# Patient Record
Sex: MALE | Race: WHITE | URBAN - METROPOLITAN AREA
[De-identification: names, ages, dates, MRNs, and addresses within clinical notes are randomized per-mention and may not be internally consistent; named-entity substitution may affect disease eponyms.]

---

## 2017-01-06 ENCOUNTER — PRE VISIT (OUTPATIENT)
Dept: CARDIOLOGY | Facility: CLINIC | Age: 27
End: 2017-01-06

## 2017-01-06 DIAGNOSIS — R94.31 ABNORMAL ELECTROCARDIOGRAM: Primary | ICD-10-CM

## 2017-02-02 ENCOUNTER — TELEPHONE (OUTPATIENT)
Dept: CARDIOLOGY | Facility: CLINIC | Age: 27
End: 2017-02-02

## 2017-02-09 ENCOUNTER — PRE VISIT (OUTPATIENT)
Dept: CARDIOLOGY | Facility: CLINIC | Age: 27
End: 2017-02-09

## 2017-02-09 DIAGNOSIS — I45.10 RBBB: Primary | ICD-10-CM

## 2017-02-15 ENCOUNTER — RADIANT APPOINTMENT (OUTPATIENT)
Dept: CARDIOLOGY | Facility: CLINIC | Age: 27
End: 2017-02-15
Attending: INTERNAL MEDICINE
Payer: COMMERCIAL

## 2017-02-15 ENCOUNTER — OFFICE VISIT (OUTPATIENT)
Dept: CARDIOLOGY | Facility: CLINIC | Age: 27
End: 2017-02-15
Payer: COMMERCIAL

## 2017-02-15 VITALS
SYSTOLIC BLOOD PRESSURE: 128 MMHG | HEART RATE: 80 BPM | WEIGHT: 196 LBS | OXYGEN SATURATION: 100 % | DIASTOLIC BLOOD PRESSURE: 82 MMHG

## 2017-02-15 DIAGNOSIS — R94.31 ABNORMAL ELECTROCARDIOGRAM: ICD-10-CM

## 2017-02-15 DIAGNOSIS — R06.02 SOB (SHORTNESS OF BREATH): ICD-10-CM

## 2017-02-15 DIAGNOSIS — R06.02 SOB (SHORTNESS OF BREATH): Primary | ICD-10-CM

## 2017-02-15 PROCEDURE — 93306 TTE W/DOPPLER COMPLETE: CPT | Performed by: INTERNAL MEDICINE

## 2017-02-15 PROCEDURE — 99203 OFFICE O/P NEW LOW 30 MIN: CPT | Mod: 25 | Performed by: INTERNAL MEDICINE

## 2017-02-15 PROCEDURE — 93000 ELECTROCARDIOGRAM COMPLETE: CPT | Performed by: INTERNAL MEDICINE

## 2017-02-15 ASSESSMENT — PAIN SCALES - GENERAL: PAINLEVEL: SEVERE PAIN (7)

## 2017-02-15 NOTE — LETTER
Date:February 16, 2017      Patient was self referred, no letter generated. Do not send.        [facility] Information

## 2017-02-15 NOTE — NURSING NOTE
Chief Complaint   Patient presents with     Heart Problem     abnormal EKG - needs EKG.  C/o being worn out for a while and some increased with exertion or exercise. Denies chest pain, dizziness- has a fainting in the past but not in over a year. Hx of abnormal rhythm from a coma (5 days induced).  No heart testing in the past       Initial /82  Pulse 80  Wt 196 lb (88.9 kg)  SpO2 100% There is no height or weight on file to calculate BMI..  BP completed using cuff size: madhav Benedict CMA

## 2017-02-15 NOTE — PROGRESS NOTES
SUBJECTIVE:  Fabio Padgett is a 26 year old male who presents for evaluation of abnormal EKG.    Prior heroin abused. 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 Had an EKG at rehab facility and this showed partial RBBB.Patient very active with no cardiac symptoms.  Non smoker.no other co-morbidities.  Had 4 syncopal episodes when he was on Heroin.     .  Current Outpatient Prescriptions   Medication Sig     Pregabalin (LYRICA PO) Take 75 mg by mouth 2 times daily     Venlafaxine HCl (EFFEXOR PO) Take 150 mg by mouth daily     No current facility-administered medications for this visit.      Past Medical History   Diagnosis Date     Depressive disorder      History reviewed. No pertinent past surgical history.  Allergies   Allergen Reactions     Penicillins Hives     Social History     Social History     Marital status: Single     Spouse name: N/A     Number of children: N/A     Years of education: N/A     Occupational History     Not on file.     Social History Main Topics     Smoking status: Current Every Day Smoker     Packs/day: 0.50     Smokeless tobacco: Not on file     Alcohol use No     Drug use: No      Comment: herion last use- 9months ago     Sexual activity: Not on file     Other Topics Concern     Not on file     Social History Narrative     Family History   Problem Relation Age of Onset     Other Cancer Father           REVIEW OF SYSTEMS:  General: negative, fever, chills, night sweats  Skin: negative, acne, rash and scaling  Eyes: negative, double vision, eye pain and photophobia  Ears/Nose/Throat: negative, nasal congestion and purulent rhinorrhea  Respiratory: No dyspnea on exertion, No cough, No hemoptysis and negative  Cardiovascular: negative, palpitations, tachycardia, irregular heart beat, chest pain, exertional chest pain or pressure, paroxysmal nocturnal dyspnea, dyspnea on exertion and orthopnea  Gastrointestinal: negative, dysphagia, nausea and vomiting  Genitourinary: negative, nocturia,  dysuria and frequency  Musculoskeletal: negative, fracture, back pain and neck pain  Neurologic: negative, headaches, stroke, seizures and paralysis  Psychiatric: negative, anxiety, nervous breakdown and depression stable  Hematologic/Lymphatic/Immunologic: negative, bleeding disorder, chills and fever  Endocrine: negative, cold intolerance, heat intolerance and hot flashes       OBJECTIVE:  Blood pressure 128/82, pulse 80, weight 88.9 kg (196 lb), SpO2 100 %.  General Appearance: healthy, alert, active and no distress  Head: Normocephalic. No masses, lesions, tenderness or abnormalities  Eyes: conjuctiva clear, PERRL, EOM intact  Ears: External ears normal. Canals clear. TM's normal.  Nose: Nares normal  Mouth: normal  Neck: Supple, no cervical adenopathy, no thyromegaly  Lungs: clear to auscultation  Cardiac: regular rate and rhythm, normal S1 and S2, no murmur  Abdomen: Soft, nontender.  Normal bowel sounds.  No hepatosplenomegaly or abnormal masses  Extremities: no peripheral edema, peripheral pulses normal  Musculoskeletal: negative  Neurological: Cranial nerves 2-12 intact, motor strength intact       ASSESSMENT/PLAN:  26 yr old with abnormal EKG. No cardiac symptoms.  Prior drug abuse. Sober for 9 months.   Reviewed outside EKG. Normal.  Reviewed today's EKG. NSR. Partial RBBB.  Will check an echo.  Patient re-assured. Near normal variant EKG.  Per orders.   Return to Clinic PRN.    Reviewed Echo and result discussed with patient. Normal LV/RV function.Normal PA pressure.Mild MR. No vegetations.

## 2017-02-15 NOTE — PATIENT INSTRUCTIONS
1.  Dr. ABEBE Barillas would like for you to have an ECHO done.  This is being done today for you, and he will discuss the results with you after your test today.        [facility] [place] [facility]    If you have any questions regarding to your visit please contact your care team:     Cardiology  Telephone Number   Maricel Duran  Cardiology RN's.    Ruth Benedict CMA [phone]    After hours: [phone].  [phone]   For scheduling appts:     [phone] or  [phone]    After hours: [phone]   For the Device Clinic (Pacemakers and ICD's)  RN's :  Tamra Edwards   During business hours: [phone]  After business hours:  [phone]- select option 4.      If you need a medication refill please contact your pharmacy.  Please allow 3 business days for your refill to be completed.    As always, Thank you for trusting us with your health care needs!  _____________________________________________________________________

## 2017-02-15 NOTE — LETTER
2/15/2017      RE: Fabio Padgett  [address]   [place] [address]       Dear Colleague,    Thank you for the opportunity to participate in the care of your patient, Fabio Padgett, at the [facility]. Please see a copy of my visit note below.       SUBJECTIVE:  Fabio Padgett is a 26 year old male who presents for evaluation of abnormal EKG.    Prior heroin abused. 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 Had an EKG at rehab facility and this showed partial RBBB.Patient very active with no cardiac symptoms.  Non smoker.no other co-morbidities.  Had 4 syncopal episodes when he was on Heroin.     .  Current Outpatient Prescriptions   Medication Sig     Pregabalin (LYRICA PO) Take 75 mg by mouth 2 times daily     Venlafaxine HCl (EFFEXOR PO) Take 150 mg by mouth daily     No current facility-administered medications for this visit.      Past Medical History   Diagnosis Date     Depressive disorder      History reviewed. No pertinent past surgical history.  Allergies   Allergen Reactions     Penicillins Hives     Social History     Social History     Marital status: Single     Spouse name: N/A     Number of children: N/A     Years of education: N/A     Occupational History     Not on file.     Social History Main Topics     Smoking status: Current Every Day Smoker     Packs/day: 0.50     Smokeless tobacco: Not on file     Alcohol use No     Drug use: No      Comment: herion last use- 9months ago     Sexual activity: Not on file     Other Topics Concern     Not on file     Social History Narrative     Family History   Problem Relation Age of Onset     Other Cancer Father           REVIEW OF SYSTEMS:  General: negative, fever, chills, night sweats  Skin: negative, acne, rash and scaling  Eyes: negative, double vision, eye pain and photophobia  Ears/Nose/Throat: negative, nasal congestion and purulent  rhinorrhea  Respiratory: No dyspnea on exertion, No cough, No hemoptysis and negative  Cardiovascular: negative, palpitations, tachycardia, irregular heart beat, chest pain, exertional chest pain or pressure, paroxysmal nocturnal dyspnea, dyspnea on exertion and orthopnea  Gastrointestinal: negative, dysphagia, nausea and vomiting  Genitourinary: negative, nocturia, dysuria and frequency  Musculoskeletal: negative, fracture, back pain and neck pain  Neurologic: negative, headaches, stroke, seizures and paralysis  Psychiatric: negative, anxiety, nervous breakdown and depression stable  Hematologic/Lymphatic/Immunologic: negative, bleeding disorder, chills and fever  Endocrine: negative, cold intolerance, heat intolerance and hot flashes       OBJECTIVE:  Blood pressure 128/82, pulse 80, weight 88.9 kg (196 lb), SpO2 100 %.  General Appearance: healthy, alert, active and no distress  Head: Normocephalic. No masses, lesions, tenderness or abnormalities  Eyes: conjuctiva clear, PERRL, EOM intact  Ears: External ears normal. Canals clear. TM's normal.  Nose: Nares normal  Mouth: normal  Neck: Supple, no cervical adenopathy, no thyromegaly  Lungs: clear to auscultation  Cardiac: regular rate and rhythm, normal S1 and S2, no murmur  Abdomen: Soft, nontender.  Normal bowel sounds.  No hepatosplenomegaly or abnormal masses  Extremities: no peripheral edema, peripheral pulses normal  Musculoskeletal: negative  Neurological: Cranial nerves 2-12 intact, motor strength intact       ASSESSMENT/PLAN:  26 yr old with abnormal EKG. No cardiac symptoms.  Prior drug abuse. Sober for 9 months.   Reviewed outside EKG. Normal.  Reviewed today's EKG. NSR. Partial RBBB.  Will check an echo.  Patient re-assured. Near normal variant EKG.  Per orders.   Return to Clinic PRN.    Reviewed Echo and result discussed with patient. Normal LV/RV function.Normal PA pressure.Mild MR. No vegetations.    Please do not hesitate to contact me if you have  any questions/concerns.     Sincerely,     FELISA Burch MD

## 2017-03-03 ENCOUNTER — OFFICE VISIT - HEALTHEAST (OUTPATIENT)
Dept: BEHAVIORAL HEALTH | Facility: CLINIC | Age: 27
End: 2017-03-03

## 2017-03-03 ENCOUNTER — AMBULATORY - HEALTHEAST (OUTPATIENT)
Dept: BEHAVIORAL HEALTH | Facility: CLINIC | Age: 27
End: 2017-03-03

## 2017-03-03 DIAGNOSIS — F11.20 OPIOID USE DISORDER, SEVERE, DEPENDENCE (H): ICD-10-CM

## 2017-03-03 ASSESSMENT — MIFFLIN-ST. JEOR: SCORE: 1876.64

## 2017-03-13 ENCOUNTER — AMBULATORY - HEALTHEAST (OUTPATIENT)
Dept: BEHAVIORAL HEALTH | Facility: CLINIC | Age: 27
End: 2017-03-13

## 2017-03-13 DIAGNOSIS — F11.20 OPIOID USE DISORDER, SEVERE, DEPENDENCE (H): ICD-10-CM

## 2017-03-14 ENCOUNTER — AMBULATORY - HEALTHEAST (OUTPATIENT)
Dept: BEHAVIORAL HEALTH | Facility: CLINIC | Age: 27
End: 2017-03-14

## 2017-03-14 DIAGNOSIS — F11.10 DRUG ABUSE, OPIOID TYPE (H): ICD-10-CM

## 2017-03-14 ASSESSMENT — MIFFLIN-ST. JEOR: SCORE: 1890.25

## 2017-03-24 ENCOUNTER — COMMUNICATION - HEALTHEAST (OUTPATIENT)
Dept: BEHAVIORAL HEALTH | Facility: CLINIC | Age: 27
End: 2017-03-24

## 2017-04-13 ENCOUNTER — COMMUNICATION - HEALTHEAST (OUTPATIENT)
Dept: BEHAVIORAL HEALTH | Facility: CLINIC | Age: 27
End: 2017-04-13

## 2017-04-13 ENCOUNTER — AMBULATORY - HEALTHEAST (OUTPATIENT)
Dept: BEHAVIORAL HEALTH | Facility: CLINIC | Age: 27
End: 2017-04-13

## 2017-04-13 DIAGNOSIS — F19.929: ICD-10-CM

## 2017-04-14 ENCOUNTER — COMMUNICATION - HEALTHEAST (OUTPATIENT)
Dept: BEHAVIORAL HEALTH | Facility: CLINIC | Age: 27
End: 2017-04-14

## 2017-04-18 ENCOUNTER — AMBULATORY - HEALTHEAST (OUTPATIENT)
Dept: BEHAVIORAL HEALTH | Facility: CLINIC | Age: 27
End: 2017-04-18

## 2017-04-18 DIAGNOSIS — F11.20 OPIOID USE DISORDER, SEVERE, DEPENDENCE (H): ICD-10-CM

## 2017-04-21 ENCOUNTER — COMMUNICATION - HEALTHEAST (OUTPATIENT)
Dept: BEHAVIORAL HEALTH | Facility: CLINIC | Age: 27
End: 2017-04-21

## 2017-04-21 ENCOUNTER — AMBULATORY - HEALTHEAST (OUTPATIENT)
Dept: BEHAVIORAL HEALTH | Facility: CLINIC | Age: 27
End: 2017-04-21

## 2017-04-21 DIAGNOSIS — F42.9 OBSESSIVE-COMPULSIVE DISORDER: ICD-10-CM

## 2017-04-28 ENCOUNTER — COMMUNICATION - HEALTHEAST (OUTPATIENT)
Dept: BEHAVIORAL HEALTH | Facility: CLINIC | Age: 27
End: 2017-04-28

## 2018-04-04 ENCOUNTER — RECORDS - HEALTHEAST (OUTPATIENT)
Dept: LAB | Facility: CLINIC | Age: 28
End: 2018-04-04

## 2018-04-04 LAB
ANION GAP SERPL CALCULATED.3IONS-SCNC: 9 MMOL/L (ref 5–18)
BUN SERPL-MCNC: 13 MG/DL (ref 8–22)
CALCIUM SERPL-MCNC: 9 MG/DL (ref 8.5–10.5)
CHLORIDE BLD-SCNC: 107 MMOL/L (ref 98–107)
CO2 SERPL-SCNC: 26 MMOL/L (ref 22–31)
CREAT SERPL-MCNC: 0.75 MG/DL (ref 0.7–1.3)
GFR SERPL CREATININE-BSD FRML MDRD: >60 ML/MIN/1.73M2
GLUCOSE BLD-MCNC: 89 MG/DL (ref 70–125)
POTASSIUM BLD-SCNC: 3.9 MMOL/L (ref 3.5–5)
SODIUM SERPL-SCNC: 142 MMOL/L (ref 136–145)

## 2018-04-09 LAB
GLIADIN IGA SER-ACNC: <0.1 U/ML
GLIADIN IGG SER-ACNC: <0.4 U/ML
IGA SERPL-MCNC: 55 MG/DL (ref 65–400)
TTG IGA SER-ACNC: <0.1 U/ML
TTG IGG SER-ACNC: <0.6 U/ML

## 2018-05-21 PROBLEM — F19.20 CHEMICAL DEPENDENCY (H): Status: ACTIVE | Noted: 2018-05-21

## 2018-05-21 PROBLEM — B19.20 HEPATITIS C VIRUS INFECTION WITHOUT HEPATIC COMA: Status: ACTIVE | Noted: 2017-03-28

## 2018-05-21 PROBLEM — I45.0 RIGHT FASCICULAR BLOCK: Status: ACTIVE | Noted: 2017-11-14

## 2018-05-21 PROBLEM — G44.40 ANALGESIC OVERUSE HEADACHE: Status: ACTIVE | Noted: 2017-06-06

## 2018-05-21 PROBLEM — J30.9 ALLERGIC RHINITIS: Status: ACTIVE | Noted: 2017-11-14

## 2018-05-21 PROBLEM — G43.009 MIGRAINE WITHOUT AURA AND WITHOUT STATUS MIGRAINOSUS, NOT INTRACTABLE: Status: ACTIVE | Noted: 2017-02-08

## 2018-05-21 PROBLEM — F12.20 CANNABIS DEPENDENCE (H): Status: ACTIVE | Noted: 2017-11-14

## 2018-05-21 PROBLEM — M54.2 NECK PAIN: Status: ACTIVE | Noted: 2017-11-14

## 2018-05-21 PROBLEM — Z72.0 TOBACCO USE: Status: ACTIVE | Noted: 2017-11-14

## 2018-05-21 PROBLEM — F11.11 HISTORY OF HEROIN ABUSE (H): Status: ACTIVE | Noted: 2017-01-03

## 2018-05-21 PROBLEM — T39.95XA ANALGESIC OVERUSE HEADACHE: Status: ACTIVE | Noted: 2017-06-06

## 2018-05-21 PROBLEM — F33.9 MAJOR DEPRESSION, RECURRENT (H): Status: ACTIVE | Noted: 2018-05-21

## 2018-12-06 ENCOUNTER — HOSPITAL ENCOUNTER (OUTPATIENT)
Dept: BEHAVIORAL HEALTH | Facility: CLINIC | Age: 28
Discharge: HOME | End: 2018-12-06
Attending: SOCIAL WORKER | Admitting: SOCIAL WORKER
Payer: COMMERCIAL

## 2018-12-06 PROCEDURE — H0001 ALCOHOL AND/OR DRUG ASSESS: HCPCS

## 2018-12-06 RX ORDER — CARISOPRODOL 350 MG/1
TABLET ORAL
COMMUNITY
Start: 2017-11-22

## 2018-12-06 RX ORDER — CLONAZEPAM 1 MG/1
1 TABLET ORAL
COMMUNITY

## 2018-12-06 RX ORDER — ESCITALOPRAM OXALATE 20 MG/1
TABLET ORAL
COMMUNITY

## 2018-12-06 RX ORDER — TRAMADOL HYDROCHLORIDE 50 MG/1
TABLET ORAL
COMMUNITY
Start: 2018-11-21

## 2018-12-06 ASSESSMENT — ANXIETY QUESTIONNAIRES
6. BECOMING EASILY ANNOYED OR IRRITABLE: NOT AT ALL
3. WORRYING TOO MUCH ABOUT DIFFERENT THINGS: SEVERAL DAYS
GAD7 TOTAL SCORE: 4
5. BEING SO RESTLESS THAT IT IS HARD TO SIT STILL: NOT AT ALL
IF YOU CHECKED OFF ANY PROBLEMS ON THIS QUESTIONNAIRE, HOW DIFFICULT HAVE THESE PROBLEMS MADE IT FOR YOU TO DO YOUR WORK, TAKE CARE OF THINGS AT HOME, OR GET ALONG WITH OTHER PEOPLE: SOMEWHAT DIFFICULT
1. FEELING NERVOUS, ANXIOUS, OR ON EDGE: SEVERAL DAYS
2. NOT BEING ABLE TO STOP OR CONTROL WORRYING: SEVERAL DAYS
7. FEELING AFRAID AS IF SOMETHING AWFUL MIGHT HAPPEN: NOT AT ALL

## 2018-12-06 ASSESSMENT — PATIENT HEALTH QUESTIONNAIRE - PHQ9
5. POOR APPETITE OR OVEREATING: SEVERAL DAYS
SUM OF ALL RESPONSES TO PHQ QUESTIONS 1-9: 3

## 2018-12-06 ASSESSMENT — PAIN SCALES - GENERAL: PAINLEVEL: MODERATE PAIN (4)

## 2018-12-06 NOTE — PROGRESS NOTES
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 taking care or children and house, thinking about and talking to staying home being a father, is not being worthless, you feel down that you are not working or making money, they put it into perspective that your kids cannot tell you that you were not there for them. Cut off all my friends that are bad influences, changed my phone number, no social media, moved away. With all the treatments and stuff they give you therapy,all the 15 times in treatment, got a lot of stuff nailed down to know what I was doing wrong, the DBT and CBT skills, mindfulness. Medications are helpful and stable.     2. Have you experienced cravings? If yes, ask follow up questions to determine if the person recognizes triggers and if the person has had any success in dealing with them.     No    3. Have you been treated for alcohol/other drug abuse/dependence?     Yes.  3B. Number of times(lifetime) (over what period) 15.  3C. Number of times completed treatment (lifetime) 10.  3D. During the past three years have you participated in outpatient and/or residential?   Yes.  3E. When and where? [facility]x, [facility] 4-5x, [facility] 3x. IP a lot, got a lot of skills, [facility] once.   3F. What was helpful? What was not? Therapy, learning the skills. Not helpful-nothing Most recently [facility] for 50 hours. Year prior completed program    4. Support group participation: Have you/do you attend support group meetings to reduce/stop your alcohol/drug use? How recently? What was your experience? Are you willing to restart? If the person has not participated, is he or she willing?     Client reported [place] group once per month through [facility], looking into other support groups in area now that moved.     5. What would assist you in staying sober/straight?     Per client:not at this point.     Dimension V Ratings   Relapse/Continued Use/Continued problem potential - The placing authority must use the criteria in Dimension V to determine a client s relapse, continued use, and continued problem potential.   RISK DESCRIPTIONS - Severity ratin Client recognizes relapse issues and prevention strategies, but displays some vulnerability for further substance use or mental health problems.    REASONS SEVERITY WAS ASSIGNED - (What information did the person provide that indicates his or her understanding of relapse issues? What about the person s experience indicates how prone he or she is to relapse? What coping skills does the person have that decrease relapse potential?)      Douglas reported he has not used heroin since 2017 and meth as May of 2018. Darellt reported he had one relapse and reported it was one use episode for meth. Darellt reported he went to treatment, moved, does not have a car, his  supportive group, CBT and DBT skills, changed his number, and medications as what helps him abstain from use. Darellt reported past treatments. Darellt reported he is currently attending his one supportive group but is looking for other supportive groups to attend in his area.           DIMENSION VI - Recovery Environment   1. Are you employed/attending school? Tell me about that.     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     2A. Describe a typical day; evening for you. Work, school, social, leisure, volunteer, spiritual practices. Include time spent obtaining, using, recovering from drugs or alcohol. (DSM)     Per client: wake up, get the kids ready, make breakfast for GF and kids, after that, get them dressed, get myself together, usually the little one goes down for a nap, the older one, is still up with me playing with me, and then stay busy cleaning, doing dishes and chores, and then three out of the seven days a week GF goes out and works as , does that, stays home with kids while she is there, get them in PJs or bath, dinner and lunch of course. Always busy one year old always busy.     2B. How often do you spend more time than you planned using or use more than you planned? (DSM)     Client reported his last use of heroin as October of 2017, meth as May of 2018. Client reported his meth use was a one time episode.     3. How important is using to your social connections? Do many of your family or friends use?     Client reported he cut old using connection, changed his cell phone number, moved, and reported he does not have social media.    4A. Are you currently in a significant relationship?     Yes-2 years    4C. Sexual Orientation:     Heterosexual    5A. Who do you live with?      Client reported living with his GF, his child, and her two children some of the time.     5B. Tell me about their alcohol/drug use and mental health issues.     denied    5C. Are you concerned for your safety there? No    5D. Are you concerned about the safety of anyone else who lives with you? No    6A. Do you have children who live with you?     Yes.  (Ask follow-up questions to determine the person s relationship and  "responsibility, both legal and care giving; and what arrangements are made for supervision for the children when the person is not available.) Aric 12/29/17-almost a year old.     6B. Do you have children who do not live with you?     No    7A. Who supports you in making changes in your alcohol or drug use? What are they willing to do to support you? Who is upset or angry about you making changes in your alcohol or drug use? How big a problem is this for you?      \"family and GF and everyone I surround myself with\"    7B. This table is provided to record information about the person s relationships and available support It is not necessary to ask each item; only to get a comprehensive picture of their support system.  How often can you count on the following people when you need someone?   Partner / Spouse Always supportive   Parent(s)/Aunt(s)/Uncle(s)/Grandparents Always supportive   Sibling(s)/Cousin(s) Usually supportive   Child(doris) N/A   Other relative(s) N/A   Friend(s)/neighbor(s) Always supportive   Child(doris) s father(s)/mother(s) Always supportive   Support group member(s) Always supportive   Community of shankar members N/A Nondenominational   /counselor/therapist/healer N/A   Other (specify) N/A     8A. What is your current living situation?     [place] GF, their child, and her two children.     8B. What is your long term plan for where you will be living?     Client reported staying where they are.     8C. Tell me about your living environment/neighborhood? Ask enough follow up questions to determine safety, criminal activity, availability of alcohol and drugs, supportive or antagonistic to the person making changes.      Per client: definitely like it feels safe.     9. Criminal justice history: Gather current/recent history and any significant history related to substance use--Arrests? Convictions? Circumstances? Alcohol or drug involvement? Sentences? " Still on probation or parole? Expectations of the court? Current court order? Any sex offenses - lifetime? What level? (DSM)    Client reported: Jose Sumner, [facility], charge was CVO, three years ago, five year term for probation. Per client: OP relapse prevention program in [place], made it through half the program, still sober, my family and I are moving and let them know ahead of time, had to leave the program, because I am in [place] now, hour a way, could not make it, my probation and I, were saying lets review this again, and see where you are in sobriety, instead, been out there for a couple months now. On a pass right now to leave to [place] to visit with my GF. When they do the transfer I will have to get out of [place] and do it from there. Makes it tricky the situation at home with the GF, have an almost one year old and GF has two other kids and she just got a job, one vehicle and I have no license. [facility] was thinking that we will transfer you to [place], my original assessment at that time, I have been through so many treatments before, like 15, been through [facility] 5 times and [facility]. I know my stuff. I know what to do and not what to do, and putting it into action. What brought me back to treatment. Had a relapse six months ago, [facility], was clean the whole time, and PO, saying I am not telling you to get into there, do not think need treatment myself, always told me what you needed, I would tell my PO, I was going into treatment. My counselor from [place] she wanted me to continue on. Different in my sobriety right now. 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 Monthly UA screenings, and do not drink at all. Meet with Jose once a month, met with him roughly 2-3 days ago. Update conditions from Rule 25. Originally one said relapse prevention program, I went into full blown program IOP/OP,made it through 50 hours, then we were moving, bigger place, in the country which is good for me  because of my mental health diagnosis it would be good for me to do therapy, and CD treatment. Went through [facility] and year before that and completed that program.     10. What obstacles exist to participating in treatment? (Time off work, childcare, funding, transportation, pending nursing home time, living situation)     None    Dimension VI Ratings   Recovery environment - The placing authority must use the criteria in Dimension VI to determine a client s recovery environment.   RISK DESCRIPTIONS - Severity ratin Client is engaged in structured, meaningful activity, but peers, family, significant other, and living environment are unsupportive, or there is criminal justice involvement by the client or among the client s peers, significant others, or in the client s living environment.    REASONS SEVERITY WAS ASSIGNED - (What support does the person have for making changes? What structure/stability does the person have in his or her daily life that will increase the likelihood that changes can be sustained? 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 Darellt reported he lives with his GF, their child, and sometimes her two children at different times. Clt reported he cut all his using ties and moved. Clt reported his GF and family as his supportive network. Clt reported past and current legal issues.          Client Choice/Exceptions   Would you like services specific to language, age, gender, culture, Yarsanism preference, race, ethnicity, sexual orientation or disability?  No    What particular treatment choices and options would you like to have? NA    Do you have a preference for a particular treatment program? NA    Criteria for Diagnosis     Criteria for Diagnosis  DSM-5 Criteria for Substance Use Disorder  Instructions: Determine whether the client currently meets the criteria for Substance Use Disorder  using the diagnostic criteria in the DSM-V pp.481-589. 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    If court related records were reviewed, summarize here: NA    Information from collateral contacts supported/largely agreed with information from the client and associated risk ratings.      Rule 25 Assessment Summary and Plan   's Recommendation    1. Abstain from using all non-prescribed mood altering chemicals and substances. Take all medication as prescribed and directed by licensed physicians.   2. Comply with all legal obligations and referrals made by [facility]. Continue with random drug screenings and breathalyzers from probation. If continuous positive readings or incidences related to non-prescribed substance use then recommendation would be an updated assessment at that time.   3. Continue care with all other licensed professional providers such as medical and mental health providers. Continue to follow all recommendations and referrals made by providers.  4. Continue to attend sober support activities/meetings.  5. Comply with all requirements of [facility] CPS case, if currently applicable.      Collateral Contacts     Name:    Jose Sumner   Relationship:    [facility] Probation/PO   Phone Number:    [phone] Releases:    Yes     Left VM for  "collateral purpose on 12/10/18. Collateral contact called on 12/10/18 and reported: UA hx he for the last all of 2018 only tested positive for his prescriptions for us. Medical marijuana prescription, benzo and amphetamine prescriptions, then he paired down to just the THC medical prescription. Last positive with us for non-prescribed illicit substance was in September 2017 positive for opiates. I know he had a positive through CPS [facility], earlier this year, got him going on his recent treatment cycle, CD assessment recommended relapse prevention program but he was having a hard time finding pure relapse program, and program that would accept his medical marijuana prescription that lead to the [facility] treatment. His tx counselor through [facility] said that he does great when in treatment but trouble transferring skills when not in treatment, he was technically discharged and did not complete because his stopped coming because his girlfriend moved and so he is in transition right now with insurance, transferring probation, medical marijuana, and one condition is to get an updated CD assessment for updated recommendation to see if he needs treatment moving forward. Generally been okay to work with, lot of prescriptions, MH and physical health conditions, seems to be connected with providers that he needs for that, he has hx with CPS issues, beginning of the year, and that lead to the positive UA and ultimately treatment but year before that 2017 he disclosed to me a relapse without being tested for it and got himself in [facility] so he has got involved with treatment on his own in the past.       Collateral Contacts     Name:    Delta Sahu   Relationship:    Girlfriend   Phone Number:    [phone]   Releases:    No     Spoke with collateral contact on 12/6/18. Collateral contact confirmed client self report and largely agreed with information provided by the client. Collateral reported:\"I also " "attend all the  and physical health appointments with him and administer his medications for him\". Collateral reported he relapsed on meth \"End of May of 2018\" heroin last use \"October of 2017\". Collateral confirmed client is addressing his mental health physical health, that he lives with her and their child and her children at times, that he is currently at home with the children, that he attends his support group once a month, and that she did kick him out when he relapsed.     ollateral Contacts      A problematic pattern of alcohol/drug use leading to clinically significant impairment or distress, as manifested by at least two of the following, occurring within a 12-month period:    Alcohol/drug is often taken in larger amounts or over a longer period than was intended.  There is a persistent desire or unsuccessful efforts to cut down or control alcohol/drug use  Continued alcohol use despite having persistent or recurrent social or interpersonal problems caused or exacerbated by the effects of alcohol/drug.      Specify if: In early remission:  After full criteria for alcohol/drug use disorder were previously met, none of the criteria for alcohol/drug use disorder have been met for at least 3 months but for less than 12 months (with the exception that Criterion A4,  Craving or a strong desire or urge to use alcohol/drug  may be met).     In sustained remission:   After full criteria for alcohol use disorder were previously met, non of the criteria for alcohol/drug use disorder have been met at any time during a period of 12 months or longer (with the exception that Criterion A4,  Craving or strong desire or urge to use alcohol/drug  may be met).   Specify if:   This additional specifier is used if the individual is in an environment where access to alcohol is restricted.    Mild: Presence of 2-3 symptoms    Moderate: Presence of 4-5 symptoms    Severe: Presence of 6 or more symptoms      "

## 2018-12-06 NOTE — PROGRESS NOTES
[address]  [place] [address]      ADULT CD ASSESSMENT ADDENDUM      Patient Name: Fabio Padgett  Cell Phone:   Home: [phone] (home)    Mobile:   No relevant phone numbers on file.       Email:  [email or web address]  Emergency Contact: Trell Padgett   Tel: [phone]    ________________________________________________________________________      The patient reported being:  Single, in a serious relationship    With which race do you identify? White    Initial Screening Questions     1. Are you currently having severe withdrawal symptoms that are putting yourself or others in danger?  No    2. Are you currently having severe medical problems that require immediate attention?  No    3. Are you currently having severe emotional or behavioral problems that are putting yourself or others at risk of harm?  No    4. Do you have sufficient reading skills that will enable you to understand written materials, including the program rules and client rights materials?  Yes    Family History and other additional information     Who raised you? (parents, grandparents, adoptive parents, step-parents, etc.)    Both Parents    Please tell me what it was like growing up in your family. (please include any history of substance abuse, mental health issues, emotional/physical/sexual abuse, forms of discipline, and support)     Per client: raised by parents, one sister and brother who are living, mother and father are living. Standard family, structure, dad worked, mom did , mom is still doing , number one in [facility] for . No abuse within family. Went to [employer] school, lived in [place] most of my life. No MH or CD within family. Felt supported by both parents equally.       Do you have any children or Stepchildren? Yes, please explain: Fawad 12-29-17    Are you being investigated by Child Protection Services? No    Do you  "have a child protection worker, probation office or ? Yes, please explain: [facility]    How would you describe your current finances?  Doing okay    If you are having problems, (unpaid bills, bankruptcy, IRS problems) please explain:  No    If working or a student are you able to function appropriately in that setting? Yes    Describe your preferred learning style:  by hands-on practice    What are your some of your personal strengths?  \"I am sober\"    Do you currently participate in community shankar activities, such as attending Taoism, temple, Baptism or Bahai services?  Client reported: we go to Taoism    How does your spirituality impact your recovery?  Per client: stronger through sobriety.     Do you currently self-administer your medications?  Yes    Have you ever had to lie to people important to you about how much you peres?     No   Have you ever felt the need to bet more and more money?     No   Have you ever attempted treatment for a gambling problem?     No   Have you ever touched or fondled someone else inappropriately or forced them to have sex with you against their will?     No   Are you or have you ever been a registered sex offender?     No   Is there any history of sexual abuse in your family?     No   Have you ever felt obsessed by your sexual behavior, such as having sex with many partners, masturbating often, using pornography often?     No     Have you ever received therapy or stayed in the hospital for mental health problems?     No     Have you ever hurt yourself, such as cutting, burning or hitting yourself?     No     Have you ever purged, binged or restricted yourself as a way to control your weight?     No     Are you on a special diet?     No     Do you have any concerns regarding your nutritional status?     No     Have you had any appetite changes in the last 3 months?     No   Have you had weight loss or weight gain of more than 10 lbs in the " last 3 months?   If patient gained or lost more than 10 lbs, then refer to program RN / attending Physician for assessment.     No   Was the patient informed of BMI?    NA     Declined to provide weight.   Have you engaged in any risk-taking behavior that would put you at risk for exposure to blood-borne or sexually transmitted diseases?     No   Do you have any dental problems?     No   Have you ever lived through any trauma or stressful life events?     No   In the past month, have you had any of the following symptoms related to the trauma listed above? (dreams, intense memories, flashbacks, physical reactions, etc.)     No   Have you ever believed people were spying on you, or that someone was plotting against you or trying to hurt you?     No   Have you ever believed someone was reading your mind or could hear your thoughts or that you could actually read someone's mind or hear what another person was thinking?     No   Have you ever believed that someone of some force outside of yourself was putting thoughts into your mind or made you act in a way that was not your usual self?  Have you ever though you were possessed?     No   Have you ever believed you were being sent special messages through the TV, radio or newspaper?     No   Have you ever heard things other people couldn't hear, such as voices or other noises?     No   Have you ever had visions when you were awake?  Or have you ever seen things other people couldn't see?     No   Do you have a valid 's license?       No, explain: client reported he does not have a car or drive.      PHQ-9, JAYANT-7 and Suicide Risk Assessment   PHQ-9 on 12/6/2018 JAYANT-7 on 12/6/2018   The patient's PHQ-9 score was 3 out of 27, indicating minimal depression.     The patient's JAYANT-7 score was 4 out of 21, indicating minimal anxiety.         Gl[place]-Suicide Severity Rating Scale   Suicide Ideation   1.) Have you ever wished you were dead or that you could go to sleep and  not wake up?     Lifetime:  No Past Month:  No   2.) Have you actually had any thoughts of killing yourself?   Lifetime:  No Past Month:  No   3.) Have you been thinking about how you might do this?     Lifetime:  NA Past Month:  NA   4.) Have you had these thoughts and had some intention of acting on them?     Lifetime:  NA Past Month:  NA   5.) Have you started to work out the details of how to kill yourself?   Lifetime:  NA Past Month:  NA   6.) Do you intend to carry out this plan?      Lifetime:  NA Past Month:  NA   Intensity of Ideation   Intensity of ideation (1 being least severe, 5 being most severe):     Lifetime:  NA Past Month:  NA   How often do you have these thoughts?  N/A      When you have the thoughts how long do they last?  N/A   Can you stop thinking about killing yourself or wanting to die if you want to?  N/A   Are there things - anyone or anything (i.e. family, Christian, pain of death) that stopped you from wanting to die or acting on thoughts of suicide?  Does not apply   What sort of reasons did you have for thinking about wanting to die or killing yourself (ie end pain, stop how you were feeling, get attention or reaction, revenge)?  Does not apply   Suicidal Behavior   (Suicide Attempt) - Have you made a suicide attempt?     Lifetime:  No Past Month:  NA   Have you engaged in self-harm (non-suicidal self-injury)?  No   (Interrupted Attempt) - Has there been a time when you started to do something to end your life but someone or something stopped you before you actually did anything?  NA   (Aborted or Self-Interrupted Attempt) - Has there been a time when you started to do something to try to end your life but you stopped yourself before you actually did anything?  NA   (Preparatory Acts of Behavior) - Have you taken any steps towards making suicide attempt or preparing to kill yourself (such as collecting pills, getting a gun, giving valuables away or writing a suicide note)?  NA   Actual  "Lethality/Medical Damage:  NA     2008  [facility] [place] for [facility] [employer]  Used with permission by Christine Matthews, PhD.       Guide to C-SSRS Risk Ratings   NO IDEATION:  with no active thoughts IDEATION: with a wish to die. IDEATION: with active thoughts. Risk Ratings   If Yes No No 0 - Very Low Risk   If NA Yes No 1 - Low Risk   If NA Yes Yes 2 - Low/moderate risk   IDEATION: associated thoughts of methods without intent or plan INTENT: Intent to follow through on suicide PLAN: Plan to follow through on suicide Risk Ratings cont...   If Yes No No 3 - Moderate Risk   If Yes Yes No 4 - High Risk   If Yes Yes Yes 5 - High Risk   The patient's ADDITIONAL RISK FACTORS and lack of PROTECTIVE FACTORS may increase their overall suicide risk ratings.     Additional Risk Factors:    Significant history of having untreated or poorly treated mental health symptoms     Significant history of untreated or poorly treated chronic pain issues   Protective Factors:    Having people in his/her life that would prevent the patient from considering a suicide attempt (i.e. young children, spouse, parents, etc.)     An absence of mental health issues or stable and well treated mental health issues     An absence of chronic health problems or stable and well treated chronic health issues     A positive relationship with his/her clinical medical and/or mental health providers     Having restricted access to highly lethal means of suicide     Risk Status   Past month:0. - Very Low Risk:  Evaluation Counselors:  Document in Epic / Class CentralAR to counselor \"Very Low Risk\".      Treatment Counselors:  Reassess upon admission as applicable, assess weekly in progress notes under Dimension 3 and summarize in Discharge / Treatment summary under Dimension 3.    Past 24 hours:0. - Very Low Risk:  Evaluation Counselors:  Document in Epic / Class CentralAR to counselor \"Very Low Risk\".      Treatment Counselors:  Reassess upon admission as applicable, " assess weekly in progress notes under Dimension 3 and summarize in Discharge / Treatment summary under Dimension 3.   Additional information to support suicide risk rating: There was no additional information to provide at this time.  Please see the above suicide risk rating information.     Mental Health Status   Physical Appearance/Attire: Appears stated age and Neat   Hygiene: well groomed   Eye Contact: at examiner   Speech Rate:  regular   Speech Volume: regular   Speech Quality: fluid   Cognitive/Perceptual:  reality based   Cognition: memory intact    Judgment: intact   Insight: able to concentrate   Orientation:  time, place, person and situation   Thought::   concrete   Hallucinations:  none   General Behavioral Tone: cooperative   Psychomotor Activity: no problem noted   Gait:  no problem   Mood: appropriate   Affect: congruence/appropriate   Counselor Notes: NA       Criteria for Diagnosis: DSM-5 Criteria for Substance Use Disorders      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  Yes    Counselor: Nidhi Noonan [facility][note 3]      Vulnerable Adult Checklist for OUTPATIENTS     1.  Do you have a physical, emotional or mental infirmity or dysfunction?       No    2.  Does this issue impair your ability to provide for your own care without help, including providing yourself with food, shelter, clothing, healthcare or supervision?       No    3.  Because of this issue, I need assistance to protect myself from maltreatment by others.      No    Based on the above information:    This person is not a functional  Vulnerable Adult according to [place] Statute 626.5572 subdivision 21.

## 2018-12-07 ASSESSMENT — ANXIETY QUESTIONNAIRES: GAD7 TOTAL SCORE: 4

## 2018-12-10 NOTE — PROGRESS NOTES
COMPREHENSIVE ASSESSMENT SUMMARY    PATIENT NAME: Fabio Padgett  MEDICAL RECORD NUMBER: [id number]  PATIENT ADDRESS: [address] [place] [address]  [facility] [address]  HOME TELEPHONE NUMBER: [phone] (home)   MOBILE TELEPHONE NUMBER:   No relevant phone numbers on file.     STATISTICS: YOB: 1990     Age: 28 year old     Gender: male    RELATIONSHIP STATUS:  Single, in a serious relationship    DATE OF ASSESSMENT: 12/6/18  EVALUATION COUNSELOR: Nidhi Noonan [facility]    REFERRAL SOURCE: Probation    REASON FOR EVALUATION:        Per EMR intake:  Pt called to schedule cd eval as requirement for probation.  Was attending OP relapse prevention groups in [place].  Moved to [place] and will be transferring to [place] probation.  Eval is needed for transfer.  Pt reports he is currently sober.  12/6/18 at 2:00pm in [place]     Per client: OP relapse prevention program in [facility], made it through half the program, still sober, my family and I are moving and let them know ahead of time, had to leave the program, because I am in [place] now, hour a way, could not make it, my probation and I, were saying lets review this again, and see where you are in sobriety, instead, been out there for a couple months now. On a pass right now to leave to [place] to visit with my GF. When they do the transfer I will have to get out of [place] and do it from there. Makes it tricky the situation at home with the GF, have an almost one year old and GF has two other kids and she just got a job, one vehicle and I have no license. [facility] was thinking that we will transfer you to [place], my original assessment at that time, I have been through so many treatments before, like 15, been through [facility] 5 times and [facility]. I know my stuff. I know what to do and not what to do, and putting it into action. What brought me back to treatment. Had a relapse six months ago, brought me to [facility], was clean the whole time, and PO, saying I am not  telling you to get into there, do not think need treatment myself, always told me what you needed, I would tell my PO, I was going into treatment. My counselor from [facility] she wanted me to continue on. Different in my sobriety right now. 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 Monthly UA screenings, and do not drink at all. Meet with Jose once a month, met with him roughly 2-3 days ago. Update conditions from Rule 25. Originally one said relapse prevention program, I went into full blown program IOP/OP,made it through 50 hours, then we were moving, bigger place, [place] the country which [place] good for me because of my mental health diagnosis it would be good for me to do therapy, and CD treatment. Went through [facility] and year before that and completed that program.         HEALTH HISTORY AND MEDICATIONS:      client reported fibromyalgia, pain 4 out of 10, good, going through pain specialist, through [facility]. Dr. Muller. Medication and after that he wants me to go to a psychologist on Monday to see I am fit for continuing on my medication. [facility] as well for psychologist appointment.12/10/18. Saw Dr. Muller once so far but he did not want to meet with again until my referral appointments were done. Blood work, x-rays, and for psychologist appointment. Could not help me further until test where done.      Per EMR medical hx:  Past Medical History        Past Medical History:   Diagnosis Date     Depressive disorder           Per EMR office note on 2/15/17:       Chief Complaint   Patient presents with     Heart Problem       abnormal EKG - needs EKG.  C/o being worn out for a while and some increased with exertion or exercise. Denies chest pain, dizziness- has a fainting in the past but not in over a year. Hx of abnormal rhythm from a coma (5 days induced).  No heart testing in the past    Fabio Padgett is a 26 year old male who presents for evaluation of abnormal EKG.     Prior heroin abused. Out from Rehab and  "sober for 9 months. Had an EKG at rehab facility and this showed partial RBBB.Patient very active with no cardiac symptoms.  Non smoker.no other co-morbidities.  Had 4 syncopal episodes when he was on Heroin.       Per client:  Concerta 1x daily, for ADHD, Dr. Brunson, Psychiatrist, [facility]-took me off adderall. I told them to take me off it. Lowest dose of concerta  Lexapro, 1x daily for depression \"\"  Abilify, 1x daily for mood stabilizer \"\"  Soma 3x daily for pain Dr. [facility] pcp  Tramadol, 1x daily for pain Dr. Yohana Day, 2x daily, for anxiety, Dr. Brunson      HISTORY OF PREVIOUS TREATMENT AND COUNSELING:     Client reported past treatment attendance. Clt reported past counseling but denied current.     HISTORY OF ALCOHOL AND DRUG USE:     Marijuana:per probation collateral medical marijuana prescription. Last use date Unknown client did not disclose prescription.     Meth/amphetamine: age of first use:28. Tried meth once time. That is what brought me into [facility]. High dose of adderall, when ran out of adderall, knew was back in environment in the [facility] and hit me I could get this. DOC is heroin. Time crunch. If cannot get that give me this. Relapse was for a day, but she felt like because it was still in my system and she did not want me in the house at all when in my system so she kicked me out of the house. She helps keep me accountable. She knew. Date of last use 6 months ago-End of May of 2018.    Heroin:age of first use: 23. per client:three years of opiates and then turned to heroin. Date of last use 2017.     Other opiates: age of first use: 20.  Per client:scripts got cut. Few years before   in 2016, and then when that happened scripts got cut in my view that is what lead me to heroin. Everybody was not getting scripts through MDs anymore. That is what started me on opiates, I broke facial bones and orbital bones, put me on Dilaudid and " "oxycodone. Taking those as prescribed and directed. Beside the pain in face was helping my back, have three bulging disc and fibromyalgia and could finally sleep for once. After that there was sickness and everything else, the lifestyle. Not at treatment point going in, treatment when hit heroin I needed heroin, getting pale, stealing, getting skinny, started doing this I would not normally do and I knew I needed treatment. Date of last use 20.     Nicotine: age of first use 19. per client:cigarettes, half pack per day. Date of last use 12/6/18, three, afternoon    SUMMARY OF SUBSTANCE USE DISORDER SYMPTOMS ACKNOWLEDGED BY THE PATIENT: The patient identified positively with 3 of the 11 DSM-5 criteria for a primary diagnostic impression of substance use disorder mild.     SUMMARY OF COLLATERAL DATA:    Girlfriend-Spoke with collateral contact on 12/6/18. Collateral contact confirmed client self report and largely agreed with information provided by the client. Collateral reported:\"I also attend all the MH and physical health appointments with him and administer his medications for him\". Collateral reported he relapsed on meth \"End of May of 2018\" heroin last use \"October of 2017\". Collateral confirmed client is addressing his mental health physical health, that he lives with her and their child and her children at times, that he is currently at home with the children, that he attends his support group once a month, and that she did kick him out when he relapsed.     Probation-Left  for collateral purpose on 12/10/18. Collateral contact called on 12/10/18 and reported: UA hx he for the last all of 2018 only tested positive for his prescriptions for us. Medical marijuana prescription, benzo and amphetamine prescriptions, then he paired down to just the THC medical prescription. Last positive with us for non-prescribed illicit substance was in September 2017 positive for opiates. I know he had a positive through CPS " [facility], earlier this year, got him going on his recent treatment cycle, CD assessment recommended relapse prevention program but he was having a hard time finding pure relapse program, and program that would accept his medical marijuana prescription that lead to the [facility] treatment. His tx counselor through [facility] said that he does great when in treatment but trouble transferring skills when not in treatment, he was technically discharged and did not complete because his stopped coming because his girlfriend moved and so he is in transition right now with insurance, transferring probation, medical marijuana, and one condition is to get an updated CD assessment for updated recommendation to see if he needs treatment moving forward. Generally been okay to work with, lot of prescriptions, MH and physical health conditions, seems to be connected with providers that he needs for that, he has hx with CPS issues, beginning of the year, and that lead to the positive UA and ultimately treatment but year before that 2017 he disclosed to me a relapse without being tested for it and got himself in [facility] so he has got involved with treatment on his own in the past.       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    Clt reported reason for the assessment was PO wanted updated CD assessment. Clt reported last use date of meth as May of 2018, opiates as 20 and heroin as October of 2017. Clt denied past admission to detox. Clt denied withdrawal symptoms.     DIMENSION 2: Biomedical Conditions:  The patient scored a 1.    Clt reported medical conditions. Clt reported he has a pcp and is able to get the services he needs. Clt reported he takes his medications as prescribed and directed by his providers for medical conditions.      DIMENSION 3: Emotional and Behavioral:  The patient scored a 1.    Clt reported mental health diagnosis. Clt reported he takes his medications as prescribed and directed for mental health symptoms by his provider. Clt reported past therapy but denied current. Clt reported he is in a  supportive group that meets once per month. Clt denied past abuse issues or current issues. Clt denied past or current SI. Clt denied past suicide attempts. Clt's PHQ-9 score was 3 out of 27, indicating minimal depression. Clt's JAYANT-7 score was 4 out of 21, indicating minimal anxiety.    DIMENSION 4: Readiness to Change:  The patient scored a 0.    Clt reported motivation for assessment was to fulfill his requirements of probation. Clt reported his girlfriend expressed concern about his use. Clt reported he agreed with her and went to treatment and told his PO. Clt reported he would like to continue with sobriety and do whatever it takes to continue with it. Clt appears to be in the preparation stage of change evidenced by his verbal report, behaviors, and last use date reported.     DIMENSION 5: Relapse Potential:  The patient scored a 1.    Clt reported he has not used heroin since October of 2017 and meth as May of 2018. Clt reported he had one relapse and reported it was one use episode for meth. Clt reported he went to treatment, moved, does not have a car, his  supportive group, CBT and DBT skills, changed his number, and medications as what helps him abstain from use. Clt reported past treatments. Clt reported he is currently attending his one supportive group but is looking for other supportive groups to attend in his area.     DIMENSION 6: Recovery Environment:  The patient scored a 2.    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 Clt reported he lives with his GF, their child, and sometimes her two children at different times. Clt reported he cut all his using ties and moved. Clt reported his GF and family as  his supportive network. Clt reported past and current legal issues.     RECOMMENDATIONS:      1. Abstain from using all non-prescribed mood altering chemicals and substances. Take all medication as prescribed and directed by licensed physicians.   2. Comply with all legal obligations and referrals made by [facility]. Continue with random drug screenings and breathalyzers from probation. If continuous positive readings or incidences related to non-prescribed substance use then recommendation would be an updated assessment at that time.   3. Continue care with all other licensed professional providers such as medical and mental health providers. Continue to follow all recommendations and referrals made by providers.  4. Continue to attend sober support activities/meetings.  5. Comply with all requirements of [facility] CPS case, if currently applicable.        INITIAL PROBLEM LIST:    The patient has current legal issues    RATIONALE: Clt reported his use has negatively impacted multiple areas of his life and he currently meets criteria for a substance use disorder occurring within a 12-month period. Clt and collateral report agreed that client is currently in early remission.       This information has been disclosed to you from records protected by Federal confidentiality rules (42 CFR part 2). The Federal rules prohibit you from making any further disclosure of this information unless further disclosure is expressly permitted by the written consent of the person to whom it pertains or as otherwise permitted by 42 CFR part 2. A general authorization for the release of medical or other information is NOT sufficient for this purpose. The Federal rules restrict any use of the information to criminally investigate or prosecute any alcohol or drug abuse patient.

## 2020-02-23 ENCOUNTER — HEALTH MAINTENANCE LETTER (OUTPATIENT)
Age: 30
End: 2020-02-23

## 2021-05-25 ENCOUNTER — RECORDS - HEALTHEAST (OUTPATIENT)
Dept: ADMINISTRATIVE | Facility: CLINIC | Age: 31
End: 2021-05-25

## 2021-05-28 ENCOUNTER — RECORDS - HEALTHEAST (OUTPATIENT)
Dept: ADMINISTRATIVE | Facility: CLINIC | Age: 31
End: 2021-05-28

## 2021-05-29 ENCOUNTER — RECORDS - HEALTHEAST (OUTPATIENT)
Dept: ADMINISTRATIVE | Facility: CLINIC | Age: 31
End: 2021-05-29

## 2021-05-30 VITALS — WEIGHT: 195 LBS | HEIGHT: 71 IN | BODY MASS INDEX: 27.3 KG/M2

## 2021-05-30 VITALS — HEIGHT: 71 IN | WEIGHT: 198 LBS | BODY MASS INDEX: 27.72 KG/M2

## 2021-05-31 ENCOUNTER — RECORDS - HEALTHEAST (OUTPATIENT)
Dept: ADMINISTRATIVE | Facility: CLINIC | Age: 31
End: 2021-05-31

## 2021-06-01 ENCOUNTER — RECORDS - HEALTHEAST (OUTPATIENT)
Dept: ADMINISTRATIVE | Facility: CLINIC | Age: 31
End: 2021-06-01

## 2021-06-09 NOTE — PROGRESS NOTES
Is patient receiving Vivitrol? yes  If yes, has patient reviewed Vivitrol questions and reminders? yes    Here today for vivitrol injection     Depression: denies  Sleep: good  Anxiety: denies  Paranoia/hallucinations/delusions: denies  SI/HI: denies    Status of last injection site: unknown, pt forgot  Medication Given: vivitrol 380mg    Route: IM    Site:RUOQ  Tolerated: well  Reaction: none     Next injection appt on: 4/13/17    130pm Pt arrives for appt 15 mins late with Rosalind CHING. He is pleasant and cooperative, answers questions easily and without hesitation. Takes Naltrexone 50mg po in front of this nurse then awaits 60 minutes for COWS assessment.   230PM COWS is noted at a 1, pt denies any complaints. Vivitrol given without incidence.    Reviewed by dr. Brunner. Agree with management. Anticipate repeat vivitrol and visit in 1 month.

## 2021-06-09 NOTE — PROGRESS NOTES
Correct pharmacy verified with patient and confirmed in snapshot? [x] yes []no    Medications Phoned  to Pharmacy [] yes [x]no  Name of Pharmacist:  List Medications, including dose, quantity and instructions      Medication Prescriptions given to patient   [] yes  [x] no   List the name of the drug the prescription was written for.       Medications ordered this visit were e-scribed.  Verified by order class [x] yes  [] no  Vivitrol and Naltrexone  Medication changes or discontinuations were communicated to patient's pharmacy: [] yes  [x] no    UA collected [x] yes    [] no    [place] Prescription Monitoring Program Reviewed? [x] yes  [] no    Referrals were made to: none    Future appointment was made: [x] yes  [] no    Dictation completed at time of chart check: [x] yes  [] no    I have checked the documentation for today s encounters and the above information has been reviewed and completed.